# Patient Record
Sex: MALE
[De-identification: names, ages, dates, MRNs, and addresses within clinical notes are randomized per-mention and may not be internally consistent; named-entity substitution may affect disease eponyms.]

---

## 2018-07-12 ENCOUNTER — HOSPITAL ENCOUNTER (EMERGENCY)
Dept: HOSPITAL 25 - UCEAST | Age: 28
Discharge: HOME | End: 2018-07-12
Payer: COMMERCIAL

## 2018-07-12 DIAGNOSIS — K12.0: Primary | ICD-10-CM

## 2018-07-12 PROCEDURE — 99202 OFFICE O/P NEW SF 15 MIN: CPT

## 2018-07-12 PROCEDURE — G0463 HOSPITAL OUTPT CLINIC VISIT: HCPCS

## 2018-07-12 NOTE — UC
General HPI





- HPI Summary


HPI Summary: 


This patient is a 28 year old M presenting to [facility] with a chief complaint of 

right mucosa white spot on the inside of his cheek since 2 days ago. The 

patient reports slight pain and notes he thought it was an infection. The 

patient rates the pain 1/10 in severity. Symptoms aggravated by nothing. 

Symptoms alleviated by nothing.








- History of Current Complaint


Chief Complaint: UCGeneralIllness


Stated Complaint: SOFT TISSUE COMPLAINT


Time Seen by Provider: 07/12/18 20:29


Hx Obtained From: Patient


Onset/Duration: Gradual Onset, Lasting Days, Still Present


Timing: Constant


Onset Severity: Mild


Current Severity: Mild


Pain Intensity: 1


Pain Location at: inner right cheek


Aggravating: nothing


Alleviating: nothing





- Allergy/Home Medications


Allergies/Adverse Reactions: 


 Allergies











Allergy/AdvReac Type Severity Reaction Status Date / Time


 


No Known Allergies Allergy   Verified 07/12/18 20:33











Home Medications: 


 Home Medications





Loratadine [Claritin 10 MG CAP] 10 mg PO DAILY 07/12/18 [History Confirmed 07/12 /18]











PMH/Surg Hx/FS Hx/Imm Hx


Previously Healthy: Yes





- Surgical History


Surgical History: Yes


Surgery Procedure, Year, and Place: appy





- Family History


Known Family History: Positive: None





- Social History


Alcohol Use: Weekly


Substance Use Type: None


Smoking Status (MU): Never Smoked Tobacco





Review of Systems


Constitutional: Negative - negative fever


ENT: Other - pain on inside of right cheek


Respiratory: Negative - negative cough


Gastrointestinal: Negative - negative vomiting


All Other Systems Reviewed And Are Negative: Yes





Physical Exam





- Summary


Physical Exam Summary: 


VITAL SIGNS: Reviewed.


GENERAL: Patient is a well-developed and nourished MALE who is lying 

comfortable in the stretcher. Patient is not in any acute respiratory distress.


HEAD AND FACE: Normocephalic


EYES: PERRLA, EOMI x 2.


EARS: Hearing grossly intact.


MOUTH: Oropharynx within normal limits. Canker sore on right inner cheek


NECK: Supple, trachea is midline, no adenopathy, no JVD, no carotid bruit.


CHEST: Symmetric, no tenderness at palpation


LUNGS: Clear to auscultation bilaterally. No wheezing or crackles.


CVS: Regular rate and rhythm, S1 and S2 present, no murmurs or gallops 

appreciated.


ABDOMEN: Soft, non-tender. Bowel sounds are normal. No abdominal abnormal 

pulsations.


EXTREMITIES: Full ROM in all major joints, no edema, no cyanosis or clubbing.


NEURO: Alert and oriented x 3. No acute neurological deficits. Speech is normal 

and follows commands.


SKIN: Dry and warm





Triage Information Reviewed: Yes


Vital Signs: 


 Initial Vital Signs











Temp  98.5 F   07/12/18 20:29


 


Pulse  62   07/12/18 20:29


 


Resp  17   07/12/18 20:29


 


BP  137/87   07/12/18 20:29


 


Pulse Ox  98   07/12/18 20:29











Vital Signs Reviewed: Yes





Course/Dx





- Course


Course Of Treatment: Patient with a canker sore.  Patient was discharged home 

with follow-up with PCP.





- Differential Dx - Multi-Symptom


Provider Diagnoses: canker sore





Discharge





- Sign-Out/Discharge


Documenting (check all that apply): Patient Departure





- Discharge Plan


Condition: Stable


Disposition: HOME


Patient Education Materials:  Canker Sores (ED)


Referrals: 


[facility] [place] [facility] [Outside]


No Primary Care Phys,NOPCP [Primary Care Provider] - 





- Billing Disposition and Condition


Condition: STABLE


Disposition: Home